# Patient Record
Sex: FEMALE | NOT HISPANIC OR LATINO | Employment: UNEMPLOYED | URBAN - METROPOLITAN AREA
[De-identification: names, ages, dates, MRNs, and addresses within clinical notes are randomized per-mention and may not be internally consistent; named-entity substitution may affect disease eponyms.]

---

## 2020-07-21 ENCOUNTER — TELEPHONE (OUTPATIENT)
Dept: ORTHOPEDICS | Facility: CLINIC | Age: 60
End: 2020-07-21

## 2020-07-21 NOTE — TELEPHONE ENCOUNTER
Referral recevied from Lior Capone MD for bilateral carpal tunnel syndrome.     Attempted to call pt. No answer. Message left to call clinic back.

## 2020-11-10 ENCOUNTER — NURSE TRIAGE (OUTPATIENT)
Dept: ADMINISTRATIVE | Facility: CLINIC | Age: 60
End: 2020-11-10

## 2020-11-10 NOTE — TELEPHONE ENCOUNTER
Care advice discussed, understanding verbalized.    Reason for Disposition   [1] Living in area with major community spread within last 14 days AND [2] NO cough or fever or breathing difficulty    Additional Information   Negative: Severe difficulty breathing (e.g., struggling for each breath, speaks in single words)   Negative: Bluish (or gray) lips or face now   Negative: Sounds like a life-threatening emergency to the triager   Negative: [1] Adult has symptoms of COVID-19 (fever, cough, or SOB) AND [2] lab test positive   Negative: [1] Adult has symptoms of COVID-19 (fever, cough or SOB) AND [2] major community spread where patient lives AND [3] testing not being done for mild symptoms   Negative: [1] Difficulty breathing (shortness of breath) occurs AND [2] onset > 14 days after COVID-19 EXPOSURE (Close Contact) AND [3] no major community spread   Negative: [1] Dry cough occurs AND [2] onset > 14 days after COVID-19 EXPOSURE AND [3] no major community spread   Negative: [1] Wet cough (i.e., white-yellow, yellow, green, or melissa colored sputum) AND [2] onset > 14 days after COVID-19 EXPOSURE AND [3] no major community spread   Negative: [1] Common cold symptoms AND [2] onset > 14 days after COVID-19 EXPOSURE AND [3] no major community spread   Negative: [1] Difficulty breathing occurs AND [2] within 14 days of COVID-19 EXPOSURE (Close Contact)   Negative: Patient sounds very sick or weak to the triager   Negative: [1] Fever (or feeling feverish) OR cough AND [2] within 14 Days of COVID-19 EXPOSURE (Close Contact)   Negative: [1] Fever (or feeling feverish) OR cough occurs AND [2] within 14 days of travel from another country (international travel)   Negative: [1] Fever (or feeling feverish) OR cough occurs AND [2] within 14 days of travel from a city or area with major community spread   Negative: [1] Fever (or feeling feverish) OR cough occurs AND [2] living in area with major community spread  AND [3] testing being done in the community for symptoms   Negative: [1] Mild body aches, chills, diarrhea, headache, runny nose, or sore throat AND [2] within 14 days of COVID-19 EXPOSURE   Negative: [1] COVID-19 EXPOSURE within last 14 days AND [2] NO cough, fever, or breathing difficulty AND [3] exposed person is a healthcare worker who was NOT using all recommended personal protective equipment (i.e., a respirator-N95 mask, eye protection, gloves, and gown)   Negative: [1] COVID-19 EXPOSURE (Close Contact) within last 14 days AND [2] NO cough, fever, or breathing difficulty    Protocols used: CORONAVIRUS (COVID-19) - EXPOSURE-A-AH

## 2020-12-24 DIAGNOSIS — E83.52 HYPERCALCEMIA: Primary | ICD-10-CM

## 2023-02-15 PROBLEM — S16.1XXA CERVICAL STRAIN, ACUTE, INITIAL ENCOUNTER: Status: ACTIVE | Noted: 2023-02-15
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
## 2023-02-15 PROBLEM — T14.90XA TRAUMA: Status: ACTIVE | Noted: 2023-02-15

## 2024-01-23 DIAGNOSIS — M25.531 PAIN IN RIGHT WRIST: Primary | ICD-10-CM

## 2024-01-23 DIAGNOSIS — G89.29 OTHER CHRONIC PAIN: ICD-10-CM

## 2024-01-23 DIAGNOSIS — M25.532 PAIN IN LEFT WRIST: ICD-10-CM

## 2024-01-23 DIAGNOSIS — M54.41 LUMBAGO WITH SCIATICA, RIGHT SIDE: Primary | ICD-10-CM

## 2025-07-13 PROBLEM — F17.200 TOBACCO DEPENDENCY: Status: ACTIVE | Noted: 2025-07-13

## 2025-07-14 PROBLEM — R78.81 BACTEREMIA: Status: ACTIVE | Noted: 2025-07-14

## 2025-07-14 PROBLEM — E78.49 OTHER HYPERLIPIDEMIA: Status: ACTIVE | Noted: 2024-10-28

## 2025-07-14 PROBLEM — N10 ACUTE PYELONEPHRITIS: Status: ACTIVE | Noted: 2025-07-14

## 2025-07-14 PROBLEM — A41.9 SEPSIS: Status: ACTIVE | Noted: 2025-07-14

## 2025-07-14 PROBLEM — F33.9 RECURRENT MAJOR DEPRESSIVE DISORDER: Status: ACTIVE | Noted: 2022-12-19

## 2025-07-14 PROBLEM — G56.00 CARPAL TUNNEL SYNDROME: Status: ACTIVE | Noted: 2019-05-09

## 2025-07-14 PROBLEM — G47.09 OTHER INSOMNIA: Status: ACTIVE | Noted: 2025-04-29

## 2025-07-15 PROBLEM — F43.10 PTSD (POST-TRAUMATIC STRESS DISORDER): Status: ACTIVE | Noted: 2025-07-15

## 2025-07-15 PROBLEM — G47.00 INSOMNIA: Status: ACTIVE | Noted: 2025-07-15

## 2025-07-15 PROBLEM — F41.9 ANXIETY DISORDER: Status: ACTIVE | Noted: 2025-07-15

## 2025-07-15 PROBLEM — F33.1 MAJOR DEPRESSIVE DISORDER, RECURRENT, MODERATE: Status: ACTIVE | Noted: 2022-12-19

## 2025-07-15 PROBLEM — Z59.86 FINANCIAL INSECURITY: Status: ACTIVE | Noted: 2025-07-15

## 2025-07-16 PROBLEM — A41.9 SEPSIS: Status: RESOLVED | Noted: 2025-07-14 | Resolved: 2025-07-16

## 2025-07-16 PROBLEM — R78.81 BACTEREMIA: Status: RESOLVED | Noted: 2025-07-14 | Resolved: 2025-07-16

## 2025-07-18 ENCOUNTER — TELEPHONE (OUTPATIENT)
Dept: PSYCHOLOGY | Facility: CLINIC | Age: 65
End: 2025-07-18
Payer: MEDICAID

## 2025-07-18 NOTE — TELEPHONE ENCOUNTER
Attempted to contact the patient. Patient did not answer. Left detailed voicemail and requested a callback.   [place] [facility] [place]

## 2025-07-18 NOTE — TELEPHONE ENCOUNTER
----- Message from Vessel sent at 7/16/2025  2:43 PM CDT -----  Regarding: [facility] [place] Psychiatry Referral Order - Medication Management Request  Good afternoon! Pt has an order in the system dated, 7/15/25 for PTSD and depression, being referred to [facility] trina/ Loren Senior for medication management.        Request Summary [[id number]]  Procedure: Ambulatory referral/consult to Psychiatry Status: Needs Scheduling  Requested appt date: 7/22/2025 Authorizing: Atif Power MD  Referral: [id number] (Pending Review)      Expires: 8/15/2026 Priority: Routine  Diagnosis: PTSD (post-traumatic stress disorder) [F43.10]  MDD (major depressive disorder), recurrent episode, moderate [F33.1]  Order Class: Internal Referral      Order Specific Questions  What is the reason for the visit?  Medication Management. PTSD, MDD          Referred to Region: Only select region(s) you would like the patient to be seen in if it is outside of the current encounter's department.  [place]. Loren Senior DNP       Thanks,  Vee :)

## 2025-08-25 PROBLEM — N10 ACUTE PYELONEPHRITIS: Status: RESOLVED | Noted: 2025-07-14 | Resolved: 2025-08-25

## 2025-09-02 DIAGNOSIS — R07.89 OTHER CHEST PAIN: Primary | ICD-10-CM

## 2025-09-05 ENCOUNTER — TELEPHONE (OUTPATIENT)
Dept: SURGERY | Facility: CLINIC | Age: 65
End: 2025-09-05
Payer: MEDICAID